# Patient Record
Sex: MALE | URBAN - METROPOLITAN AREA
[De-identification: names, ages, dates, MRNs, and addresses within clinical notes are randomized per-mention and may not be internally consistent; named-entity substitution may affect disease eponyms.]

---

## 2022-11-07 ENCOUNTER — APPOINTMENT (RX ONLY)
Dept: URBAN - METROPOLITAN AREA CLINIC 293 | Facility: CLINIC | Age: 72
Setting detail: DERMATOLOGY
End: 2022-11-07

## 2022-11-07 DIAGNOSIS — L91.0 HYPERTROPHIC SCAR: ICD-10-CM

## 2022-11-07 DIAGNOSIS — L81.4 OTHER MELANIN HYPERPIGMENTATION: ICD-10-CM

## 2022-11-07 DIAGNOSIS — D485 NEOPLASM OF UNCERTAIN BEHAVIOR OF SKIN: ICD-10-CM

## 2022-11-07 DIAGNOSIS — L82.1 OTHER SEBORRHEIC KERATOSIS: ICD-10-CM

## 2022-11-07 PROBLEM — D48.5 NEOPLASM OF UNCERTAIN BEHAVIOR OF SKIN: Status: ACTIVE | Noted: 2022-11-07

## 2022-11-07 PROCEDURE — 11103 TANGNTL BX SKIN EA SEP/ADDL: CPT

## 2022-11-07 PROCEDURE — ? COUNSELING

## 2022-11-07 PROCEDURE — 11900 INJECT SKIN LESIONS </W 7: CPT

## 2022-11-07 PROCEDURE — ? INTRALESIONAL KENALOG

## 2022-11-07 PROCEDURE — ? SUNSCREEN RECOMMENDATIONS

## 2022-11-07 PROCEDURE — 11102 TANGNTL BX SKIN SINGLE LES: CPT

## 2022-11-07 PROCEDURE — 99203 OFFICE O/P NEW LOW 30 MIN: CPT | Mod: 25

## 2022-11-07 PROCEDURE — ? BIOPSY BY SHAVE METHOD

## 2022-11-07 ASSESSMENT — LOCATION ZONE DERM
LOCATION ZONE: ARM
LOCATION ZONE: FACE
LOCATION ZONE: SCALP
LOCATION ZONE: TRUNK

## 2022-11-07 ASSESSMENT — LOCATION SIMPLE DESCRIPTION DERM
LOCATION SIMPLE: SCALP
LOCATION SIMPLE: RIGHT SHOULDER
LOCATION SIMPLE: LEFT UPPER BACK
LOCATION SIMPLE: LEFT TEMPLE
LOCATION SIMPLE: ABDOMEN

## 2022-11-07 ASSESSMENT — LOCATION DETAILED DESCRIPTION DERM
LOCATION DETAILED: RIGHT POSTERIOR SHOULDER
LOCATION DETAILED: LEFT SUPERIOR UPPER BACK
LOCATION DETAILED: LEFT CENTRAL FRONTAL SCALP
LOCATION DETAILED: LEFT LATERAL TEMPLE
LOCATION DETAILED: EPIGASTRIC SKIN

## 2023-08-29 ENCOUNTER — OFFICE VISIT (OUTPATIENT)
Dept: ORTHOPEDICS | Facility: CLINIC | Age: 73
End: 2023-08-29
Payer: MEDICARE

## 2023-08-29 DIAGNOSIS — M25.561 ACUTE PAIN OF RIGHT KNEE: Primary | ICD-10-CM

## 2023-08-29 PROCEDURE — 99203 OFFICE O/P NEW LOW 30 MIN: CPT | Mod: 25 | Performed by: FAMILY MEDICINE

## 2023-08-29 PROCEDURE — 20610 DRAIN/INJ JOINT/BURSA W/O US: CPT | Mod: RT | Performed by: FAMILY MEDICINE

## 2023-08-29 RX ORDER — PRIMIDONE 50 MG/1
50 TABLET ORAL
COMMUNITY

## 2023-08-29 RX ORDER — TRIAMCINOLONE ACETONIDE 40 MG/ML
40 INJECTION, SUSPENSION INTRA-ARTICULAR; INTRAMUSCULAR
Status: SHIPPED | OUTPATIENT
Start: 2023-08-29

## 2023-08-29 RX ORDER — LIDOCAINE HYDROCHLORIDE 10 MG/ML
4 INJECTION, SOLUTION EPIDURAL; INFILTRATION; INTRACAUDAL; PERINEURAL
Status: SHIPPED | OUTPATIENT
Start: 2023-08-29

## 2023-08-29 RX ORDER — TAMSULOSIN HYDROCHLORIDE 0.4 MG/1
0.8 CAPSULE ORAL
COMMUNITY

## 2023-08-29 RX ORDER — ASPIRIN 81 MG/1
81 TABLET ORAL DAILY
COMMUNITY

## 2023-08-29 RX ORDER — IBUPROFEN 200 MG
400 TABLET ORAL
COMMUNITY

## 2023-08-29 RX ORDER — MULTIVITAMIN
1 TABLET ORAL DAILY
COMMUNITY

## 2023-08-29 RX ORDER — ROSUVASTATIN CALCIUM 5 MG/1
5 TABLET, COATED ORAL DAILY
COMMUNITY

## 2023-08-29 RX ADMIN — LIDOCAINE HYDROCHLORIDE 4 ML: 10 INJECTION, SOLUTION EPIDURAL; INFILTRATION; INTRACAUDAL; PERINEURAL at 17:26

## 2023-08-29 RX ADMIN — TRIAMCINOLONE ACETONIDE 40 MG: 40 INJECTION, SUSPENSION INTRA-ARTICULAR; INTRAMUSCULAR at 17:26

## 2023-08-29 NOTE — NURSING NOTE
[address] [address]  Dept: [phone]  ______________________________________________________________________________    Patient: Christopher Olea   : 1950   MRN: [id number]   2023    INVASIVE PROCEDURE SAFETY CHECKLIST    Date: 23   Procedure:Right knee IA CSI  Patient Name: Christopher Olea  MRN: [id number]  YOB: 1950    Action: Complete sections as appropriate. Any discrepancy results in a HARD COPY until resolved.     PRE PROCEDURE:  Patient ID verified with 2 identifiers (name and  or MRN): Yes  Procedure and site verified with patient/designee (when able): Yes  Accurate consent documentation in medical record: Yes  H&P (or appropriate assessment) documented in medical record: Yes  H&P must be up to 20 days prior to procedure and updates within 24 hours of procedure as applicable: NA  Relevant diagnostic and radiology test results appropriately labeled and displayed as applicable: Yes  Procedure site(s) marked with provider initials: NA    TIMEOUT:  Time-Out performed immediately prior to starting procedure, including verbal and active participation of all team members addressing the following:Yes  * Correct patient identify  * Confirmed that the correct side and site are marked  * An accurate procedure consent form  * Agreement on the procedure to be done  * Correct patient position  * Relevant images and results are properly labeled and appropriately displayed  * The need to administer antibiotics or fluids for irrigation purposes during the procedure as applicable   * Safety precautions based on patient history or medication use    DURING PROCEDURE: Verification of correct person, site, and procedures any time the responsibility for care of the patient is transferred to another member of the care team.       Prior to injection, verified patient identity using patient's name and date of birth.  Due  to injection administration, patient instructed to remain in clinic for 15 minutes  afterwards, and to report any adverse reaction to me immediately.    Joint injection was performed.      Drug Amount Wasted:  None.  Vial/Syringe: Single dose vial  Expiration Date:  4/1/25      Dany Barr, ATC  August 29, 2023

## 2023-08-29 NOTE — LETTER
8/29/2023      RE: Christopher Olea  [address]  [facility] [address]     Dear Colleague,    Thank you for referring your patient, Christopher Olea, to the [facility] [place]. Please see a copy of my visit note below.    CHIEF COMPLAINT:  Pain of the Right Knee     HISTORY OF PRESENT ILLNESS  Mr. Olea is a pleasant 72 year old year old male who presents to clinic today with right knee pain.  Christopher explains that back in may he lifted a cabinet and felt pain in his medial right knee. The pain was dull and continuous and pronounced on the medial side of his knee. He was initially evaluated at an [place] center where an Xray was taken and he was given cortisone injection. He responded well to the injection, but his symptoms returned when he took on a physically demanding project. Patient lives in [place], and only in [place] for the summer, and would like a cortisone shot for symptomatic relief.      Onset: sudden  Location: right medial knee  Quality:  aching and dull  Duration: 3 months   Severity: 4/10 at worst  Timing:intermittent episodes worse with long periods of sitting  Modifying factors:  resting and non-use makes it better, movement and use makes it worse  Associated signs & symptoms: pain  Previous similar pain: No  Treatments to date:Advil     Additional history: as documented    Review of Systems:  Have you recently had a a fever, chills, weight loss? No  Do you have any vision problems? No  Do you have any chest pain or edema? No  Do you have any shortness of breath or wheezing?  No  Do you have stomach problems? No  Do you have any numbness or focal weakness? No  Do you have diabetes? No  Do you have problems with bleeding or clotting? No  Do you have an rashes or other skin lesions? No    MEDICAL HISTORY  There is no problem list on file for this patient.      No current outpatient medications on file.       Not on File    No family history on file.    Additional  medical/Social/Surgical histories reviewed in [facility] and updated as appropriate.       PHYSICAL EXAM  There were no vitals taken for this visit.     General  - normal appearance, in no obvious distress  Musculoskeletal - Right knee  - stance: normal gait without limp  - inspection: no swelling or effusion appreciated,  normal bone and joint alignment, no obvious deformity  - palpation: medial joint line tenderness, medial patellar facet tenderness. Patellar tendon non-tender  - ROM: 135 degrees flexion, 0 degrees extension, not painful, normal actively and passively compared to contralateral  - strength: 5/5 in flexion, 5/5 in extension  - special tests:  (-) Lachman  (-) Johnnie  (-) varus at 0 and 30 degrees flexion  (-) valgus at 0 and 30 degrees flexion  Neuro  - no sensory or motor deficit, grossly normal coordination, normal muscle tone  Skin  - no ecchymosis, erythema, warmth, or induration, no obvious rash    IMAGING :    PER REVIEW OF PATIENTS XR IMAGES ON PHONE MINIMAL DEGENERATIVE CHANGES OF RIGHT KNEE    ASSESSMENT & PLAN  Mr. Olea is a 72 year old year old male who presents to clinic today with right knee pain for the past three months. Clinical presentation and improvement with previous cortisone shot is consistent with osteoarthritis of the right knee vs. Degenerative symptomatic meniscal tear.     Diagnosis:Acute pain of right knee    - Risks benefits alternatives discussed for repeating Cortisone injection today. Agreed to proceed.  - If no improvement he will pursue additional care back in [place], [place] where I would recommend MRI if persisting.  If indeed MRI confirms related to DJD, consideration for HA may be given as well.  If meniscal, can determine whether arthroscopy could provide longer relief.  - Follow-up with MRI imaging if symptoms return or worsen.     PROCEDURE    Right Knee Injection - Intraarticular  The patient was informed of the risks and the benefits of the procedure and a  written consent was signed.  The patient s right knee was prepped with chlorhexidine in sterile fashion.   40 mg of triamcinolone suspension was drawn up into a 5 mL syringe with 4 mL of 1% lidocaine.  Injection was performed using substerile technique.  A 1.5-inch 22-gauge needle was used to enter the lateral aspect of the right knee.  Injection performed successfully without difficulty.  There were no complications. The patient tolerated the procedure well. There was negligible bleeding.   The patient was instructed to ice the knee upon leaving clinic and refrain from overuse over the next 3 days.   The patient was instructed to call or go to the emergency room with any unusual pain, swelling, redness, or if otherwise concerned.  A follow up appointment will be scheduled to evaluate response to the injection, and to assess range of motion and pain.  Tae Robertson,   [facility] [employer]    I, Ant Mensah, was present with the medical student Tae Robertson MD who participated in the service and in the documentation of the note.  I have verified the history and personally performed the physical exam and medical decision making. I agree with the assessment and plan of care as documented in the note.      Items personally reviewed: imaging and agree with the interpretation documented in the note. Minor changes were made above.    Ant Mensah DO     It was a pleasure seeing Christopher today.    Ant Mensah DO, [facility]  Primary [facility] Medicine      Large Joint Injection: R knee joint    Date/Time: 8/29/2023 5:26 PM    Performed by: Ant Mensah DO  Authorized by: Ant Mensah DO    Indications:  Pain and osteoarthritis  Needle Size:  22 G  Guidance: landmark guided    Approach:  Anterolateral  Location:  Knee      Medications:  40 mg triamcinolone 40 MG/ML; 4 mL lidocaine (PF) 1 %  Outcome:  Tolerated well, no immediate complications  Procedure discussed: discussed risks, benefits, and alternatives    Timeout:  timeout called immediately prior to procedure    Prep: patient was prepped and draped in usual sterile fashion     Dany Barr M.A., LAT, ATC  Certified Athletic Trainer            Again, thank you for allowing me to participate in the care of your patient.      Sincerely,    Ant Mensah, DO

## 2023-08-29 NOTE — PROGRESS NOTES
CHIEF COMPLAINT:  Pain of the Right Knee     HISTORY OF PRESENT ILLNESS  Mr. Olea is a pleasant 72 year old year old male who presents to clinic today with right knee pain.  Christopher explains that back in may he lifted a cabinet and felt pain in his medial right knee. The pain was dull and continuous and pronounced on the medial side of his knee. He was initially evaluated at an [place] center where an Xray was taken and he was given cortisone injection. He responded well to the injection, but his symptoms returned when he took on a physically demanding project. Patient lives in [place], and only in [place] for the summer, and would like a cortisone shot for symptomatic relief.      Onset: sudden  Location: right medial knee  Quality:  aching and dull  Duration: 3 months   Severity: 4/10 at worst  Timing:intermittent episodes worse with long periods of sitting  Modifying factors:  resting and non-use makes it better, movement and use makes it worse  Associated signs & symptoms: pain  Previous similar pain: No  Treatments to date:Advil     Additional history: as documented    Review of Systems:  Have you recently had a a fever, chills, weight loss? No  Do you have any vision problems? No  Do you have any chest pain or edema? No  Do you have any shortness of breath or wheezing?  No  Do you have stomach problems? No  Do you have any numbness or focal weakness? No  Do you have diabetes? No  Do you have problems with bleeding or clotting? No  Do you have an rashes or other skin lesions? No    MEDICAL HISTORY  There is no problem list on file for this patient.      No current outpatient medications on file.       Not on File    No family history on file.    Additional medical/Social/Surgical histories reviewed in [facility] and updated as appropriate.       PHYSICAL EXAM  There were no vitals taken for this visit.     General  - normal appearance, in no obvious distress  Musculoskeletal - Right knee  - stance: normal gait without  limp  - inspection: no swelling or effusion appreciated,  normal bone and joint alignment, no obvious deformity  - palpation: medial joint line tenderness, medial patellar facet tenderness. Patellar tendon non-tender  - ROM: 135 degrees flexion, 0 degrees extension, not painful, normal actively and passively compared to contralateral  - strength: 5/5 in flexion, 5/5 in extension  - special tests:  (-) Lachman  (-) Johnnie  (-) varus at 0 and 30 degrees flexion  (-) valgus at 0 and 30 degrees flexion  Neuro  - no sensory or motor deficit, grossly normal coordination, normal muscle tone  Skin  - no ecchymosis, erythema, warmth, or induration, no obvious rash    IMAGING :    PER REVIEW OF PATIENTS XR IMAGES ON PHONE MINIMAL DEGENERATIVE CHANGES OF RIGHT KNEE    ASSESSMENT & PLAN  Mr. Olea is a 72 year old year old male who presents to clinic today with right knee pain for the past three months. Clinical presentation and improvement with previous cortisone shot is consistent with osteoarthritis of the right knee vs. Degenerative symptomatic meniscal tear.     Diagnosis:Acute pain of right knee    - Risks benefits alternatives discussed for repeating Cortisone injection today. Agreed to proceed.  - If no improvement he will pursue additional care back in [place], [place] where I would recommend MRI if persisting.  If indeed MRI confirms related to DJD, consideration for HA may be given as well.  If meniscal, can determine whether arthroscopy could provide longer relief.  - Follow-up with MRI imaging if symptoms return or worsen.     PROCEDURE    Right Knee Injection - Intraarticular  The patient was informed of the risks and the benefits of the procedure and a written consent was signed.  The patient s right knee was prepped with chlorhexidine in sterile fashion.   40 mg of triamcinolone suspension was drawn up into a 5 mL syringe with 4 mL of 1% lidocaine.  Injection was performed using substerile technique.  A  1.5-inch 22-gauge needle was used to enter the lateral aspect of the right knee.  Injection performed successfully without difficulty.  There were no complications. The patient tolerated the procedure well. There was negligible bleeding.   The patient was instructed to ice the knee upon leaving clinic and refrain from overuse over the next 3 days.   The patient was instructed to call or go to the emergency room with any unusual pain, swelling, redness, or if otherwise concerned.  A follow up appointment will be scheduled to evaluate response to the injection, and to assess range of motion and pain.  Tae Robertson,   [facility] [employer]    I, Ant Mensah, was present with the medical student Tae Robertson MD who participated in the service and in the documentation of the note.  I have verified the history and personally performed the physical exam and medical decision making. I agree with the assessment and plan of care as documented in the note.      Items personally reviewed: imaging and agree with the interpretation documented in the note. Minor changes were made above.    Ant Mensah DO     It was a pleasure seeing Christopher today.    Ant Mensah DO, [facility]  [facility] Medicine      Large Joint Injection: R knee joint    Date/Time: 8/29/2023 5:26 PM    Performed by: Ant Mensah DO  Authorized by: Ant Mensah DO    Indications:  Pain and osteoarthritis  Needle Size:  22 G  Guidance: landmark guided    Approach:  Anterolateral  Location:  Knee      Medications:  40 mg triamcinolone 40 MG/ML; 4 mL lidocaine (PF) 1 %  Outcome:  Tolerated well, no immediate complications  Procedure discussed: discussed risks, benefits, and alternatives    Timeout: timeout called immediately prior to procedure    Prep: patient was prepped and draped in usual sterile fashion     Dany Barr M.A., LAT, ATC  Certified Athletic Trainer

## (undated) RX ORDER — LIDOCAINE HYDROCHLORIDE 10 MG/ML
INJECTION, SOLUTION EPIDURAL; INFILTRATION; INTRACAUDAL; PERINEURAL
Status: DISPENSED
Start: 2023-08-29

## (undated) RX ORDER — TRIAMCINOLONE ACETONIDE 40 MG/ML
INJECTION, SUSPENSION INTRA-ARTICULAR; INTRAMUSCULAR
Status: DISPENSED
Start: 2023-08-29